# Patient Record
Sex: MALE | Race: WHITE | NOT HISPANIC OR LATINO
[De-identification: names, ages, dates, MRNs, and addresses within clinical notes are randomized per-mention and may not be internally consistent; named-entity substitution may affect disease eponyms.]

---

## 2017-11-21 ENCOUNTER — APPOINTMENT (OUTPATIENT)
Dept: HEART AND VASCULAR | Facility: CLINIC | Age: 42
End: 2017-11-21
Payer: COMMERCIAL

## 2017-11-21 VITALS
HEART RATE: 74 BPM | BODY MASS INDEX: 27.6 KG/M2 | HEIGHT: 67.72 IN | TEMPERATURE: 97.6 F | WEIGHT: 179.99 LBS | SYSTOLIC BLOOD PRESSURE: 130 MMHG | DIASTOLIC BLOOD PRESSURE: 80 MMHG | OXYGEN SATURATION: 97 %

## 2017-11-21 DIAGNOSIS — Z82.49 FAMILY HISTORY OF ISCHEMIC HEART DISEASE AND OTHER DISEASES OF THE CIRCULATORY SYSTEM: ICD-10-CM

## 2017-11-21 DIAGNOSIS — K63.5 POLYP OF COLON: ICD-10-CM

## 2017-11-21 DIAGNOSIS — Z78.9 OTHER SPECIFIED HEALTH STATUS: ICD-10-CM

## 2017-11-21 DIAGNOSIS — Z83.49 FAMILY HISTORY OF OTHER ENDOCRINE, NUTRITIONAL AND METABOLIC DISEASES: ICD-10-CM

## 2017-11-21 PROCEDURE — 99204 OFFICE O/P NEW MOD 45 MIN: CPT | Mod: 25

## 2017-11-21 PROCEDURE — 93000 ELECTROCARDIOGRAM COMPLETE: CPT

## 2017-11-21 RX ORDER — OMEPRAZOLE 20 MG/1
20 CAPSULE, DELAYED RELEASE ORAL
Refills: 0 | Status: ACTIVE | COMMUNITY

## 2017-12-10 ENCOUNTER — FORM ENCOUNTER (OUTPATIENT)
Age: 42
End: 2017-12-10

## 2017-12-11 ENCOUNTER — OUTPATIENT (OUTPATIENT)
Dept: OUTPATIENT SERVICES | Facility: HOSPITAL | Age: 42
LOS: 1 days | End: 2017-12-11
Payer: COMMERCIAL

## 2017-12-11 PROCEDURE — 75571 CT HRT W/O DYE W/CA TEST: CPT | Mod: 26

## 2017-12-11 PROCEDURE — 75571 CT HRT W/O DYE W/CA TEST: CPT

## 2017-12-13 ENCOUNTER — APPOINTMENT (OUTPATIENT)
Dept: HEART AND VASCULAR | Facility: CLINIC | Age: 42
End: 2017-12-13
Payer: COMMERCIAL

## 2017-12-13 DIAGNOSIS — R07.9 CHEST PAIN, UNSPECIFIED: ICD-10-CM

## 2017-12-13 DIAGNOSIS — G80.9 CEREBRAL PALSY, UNSPECIFIED: ICD-10-CM

## 2017-12-13 PROCEDURE — 93306 TTE W/DOPPLER COMPLETE: CPT

## 2017-12-13 PROCEDURE — 93015 CV STRESS TEST SUPVJ I&R: CPT

## 2019-03-18 ENCOUNTER — FORM ENCOUNTER (OUTPATIENT)
Age: 44
End: 2019-03-18

## 2019-03-19 ENCOUNTER — OUTPATIENT (OUTPATIENT)
Dept: OUTPATIENT SERVICES | Facility: HOSPITAL | Age: 44
LOS: 1 days | End: 2019-03-19
Payer: COMMERCIAL

## 2019-03-19 ENCOUNTER — APPOINTMENT (OUTPATIENT)
Dept: ORTHOPEDIC SURGERY | Facility: CLINIC | Age: 44
End: 2019-03-19
Payer: COMMERCIAL

## 2019-03-19 DIAGNOSIS — M25.562 PAIN IN LEFT KNEE: ICD-10-CM

## 2019-03-19 DIAGNOSIS — G89.29 PAIN IN LEFT KNEE: ICD-10-CM

## 2019-03-19 PROCEDURE — 73564 X-RAY EXAM KNEE 4 OR MORE: CPT

## 2019-03-19 PROCEDURE — 73564 X-RAY EXAM KNEE 4 OR MORE: CPT | Mod: 26,LT

## 2019-03-19 PROCEDURE — 99213 OFFICE O/P EST LOW 20 MIN: CPT

## 2019-04-12 NOTE — PHYSICAL EXAM
[de-identified] : Constitutional: Well appearing.  No acute distress.\par Mental Status: Alert & oriented to person, place and time. Normal affect.\par Pulmonary: No respiratory distress. Normal chest excursion.\par Abdomen: Soft and not distended.\par Gait: Normal.\par Ambulatory assist devices: None.\par \par Cervical spine: Skin intact. No visible deformity.  Painless active ROM without evident restriction.\par Bilateral upper extremities: Skin intact. No deformity. Painless active ROM without evident restriction.\par Thoracolumbar spine: No deformity. No tenderness. No radicular pain on passive straight leg raise bilaterally.\par Pelvis: No pelvic obliquity. No tenderness.\par Leg lengths: Equal.\par \par Bilateral Hips: No swelling or deformity. No focal tenderness. Painless and unrestricted range of motion.  No crepitation. Hip flexor and abductor power 5/5.\par \par Right Knee:\par Skin intact.\par No surgical scars.\par No erythema or ecchymosis.\par No swelling or effusion.\par No deformity.\par No focal tenderness.\par Painless and unrestricted range of motion. \par Central patellar tracking.\par No crepitation.\par No instability.\par Quadriceps power 5/5.\par \par Left Knee:\par Skin intact.\par No surgical scars.\par No erythema or ecchymosis.\par No swelling or effusion.\par No deformity.\par No focal tenderness.\par Painless and unrestricted range of motion. \par Central patellar tracking.\par No crepitation.\par No instability.\par Quadriceps power 5/5.\par \par Neurological: Intact distal crude touch sensation. Normal distal motor power. Symmetric knee jerk and ankle jerk reflexes bilaterally.\par Cardiovascular: Palpable dorsalis pedis and posterior tibialis pulses. Brisk capillary refill. No peripheral edema.\par Lymphatics:  No peripheral adenopathy appreciated.\par  [de-identified] : X-rays taken today demonstrate left knee with mild to moderate medial compartment DJD without evidence of acute fx or dislocation

## 2019-04-12 NOTE — DISCUSSION/SUMMARY
[de-identified] : Diagnosis, prognosis and treatment options discussed. Conservative management including activity modification, therapeutic exercise with PT, topical and oral antiinflammatories, steroid and HA injections discussed. Patient is not currently having any severe pain to warrant intervention at this time. Discussed activity modification and low impact exercise. Patient may follow up PRN.

## 2019-04-12 NOTE — ADDENDUM
[FreeTextEntry1] : Dr. Preston was physically present during the key portions the encounter, provided assistance as needed, and formulated the assessment and plan as documented above.\par \par

## 2019-04-12 NOTE — HISTORY OF PRESENT ILLNESS
[de-identified] : 42 y/o M presents today for follow up of left knee pain. He reports increase in pain several months ago after falling sideways into a bed frame. Pain has significantly improved since that time.  He reports mild or occasional left knee pain and also notes that the knee cracks on flexion. He can walk an unlimited distance and uses stairs normally.

## 2019-07-01 ENCOUNTER — RESULT REVIEW (OUTPATIENT)
Age: 44
End: 2019-07-01

## 2019-07-01 ENCOUNTER — OUTPATIENT (OUTPATIENT)
Dept: OUTPATIENT SERVICES | Facility: HOSPITAL | Age: 44
LOS: 1 days | Discharge: HOME | End: 2019-07-01
Payer: COMMERCIAL

## 2019-07-01 PROCEDURE — 88305 TISSUE EXAM BY PATHOLOGIST: CPT

## 2019-07-01 PROCEDURE — 43239 EGD BIOPSY SINGLE/MULTIPLE: CPT

## 2019-07-02 LAB — SURGICAL PATHOLOGY STUDY: SIGNIFICANT CHANGE UP

## 2019-07-22 ENCOUNTER — INPATIENT (INPATIENT)
Facility: HOSPITAL | Age: 44
LOS: 0 days | Discharge: HOME | DRG: 419 | End: 2019-07-23
Attending: SURGERY | Admitting: SURGERY
Payer: COMMERCIAL

## 2019-07-22 ENCOUNTER — RESULT REVIEW (OUTPATIENT)
Age: 44
End: 2019-07-22

## 2019-07-22 VITALS
DIASTOLIC BLOOD PRESSURE: 99 MMHG | HEIGHT: 69 IN | OXYGEN SATURATION: 100 % | RESPIRATION RATE: 16 BRPM | HEART RATE: 74 BPM | TEMPERATURE: 98 F | WEIGHT: 177.91 LBS | SYSTOLIC BLOOD PRESSURE: 144 MMHG

## 2019-07-22 LAB
ALBUMIN SERPL ELPH-MCNC: 4.2 G/DL — SIGNIFICANT CHANGE UP (ref 3.3–5)
ALP SERPL-CCNC: 85 U/L — SIGNIFICANT CHANGE UP (ref 40–120)
ALT FLD-CCNC: 30 U/L — SIGNIFICANT CHANGE UP (ref 10–45)
AST SERPL-CCNC: 29 U/L — SIGNIFICANT CHANGE UP (ref 10–40)
BILIRUB DIRECT SERPL-MCNC: <0.2 MG/DL — SIGNIFICANT CHANGE UP (ref 0–0.2)
BILIRUB INDIRECT FLD-MCNC: >0.1 MG/DL — LOW (ref 0.2–1)
BILIRUB SERPL-MCNC: 0.3 MG/DL — SIGNIFICANT CHANGE UP (ref 0.2–1.2)
LIDOCAIN IGE QN: 25 U/L — SIGNIFICANT CHANGE UP (ref 7–60)
PROT SERPL-MCNC: 7.1 G/DL — SIGNIFICANT CHANGE UP (ref 6–8.3)

## 2019-07-22 PROCEDURE — 99285 EMERGENCY DEPT VISIT HI MDM: CPT | Mod: 25

## 2019-07-22 PROCEDURE — 71046 X-RAY EXAM CHEST 2 VIEWS: CPT | Mod: 26

## 2019-07-22 PROCEDURE — 93010 ELECTROCARDIOGRAM REPORT: CPT

## 2019-07-22 RX ORDER — ENOXAPARIN SODIUM 100 MG/ML
40 INJECTION SUBCUTANEOUS EVERY 24 HOURS
Refills: 0 | Status: DISCONTINUED | OUTPATIENT
Start: 2019-07-22 | End: 2019-07-23

## 2019-07-22 RX ORDER — CEFOTETAN DISODIUM 1 G
1 VIAL (EA) INJECTION EVERY 12 HOURS
Refills: 0 | Status: COMPLETED | OUTPATIENT
Start: 2019-07-22 | End: 2019-07-23

## 2019-07-22 RX ORDER — BUPIVACAINE 13.3 MG/ML
20 INJECTION, SUSPENSION, LIPOSOMAL INFILTRATION ONCE
Refills: 0 | Status: DISCONTINUED | OUTPATIENT
Start: 2019-07-22 | End: 2019-07-23

## 2019-07-22 RX ORDER — SODIUM CHLORIDE 9 MG/ML
1000 INJECTION, SOLUTION INTRAVENOUS
Refills: 0 | Status: DISCONTINUED | OUTPATIENT
Start: 2019-07-22 | End: 2019-07-23

## 2019-07-22 RX ORDER — HYDROMORPHONE HYDROCHLORIDE 2 MG/ML
0.5 INJECTION INTRAMUSCULAR; INTRAVENOUS; SUBCUTANEOUS EVERY 4 HOURS
Refills: 0 | Status: DISCONTINUED | OUTPATIENT
Start: 2019-07-22 | End: 2019-07-23

## 2019-07-22 RX ORDER — SODIUM CHLORIDE 9 MG/ML
1000 INJECTION, SOLUTION INTRAVENOUS
Refills: 0 | Status: DISCONTINUED | OUTPATIENT
Start: 2019-07-22 | End: 2019-07-22

## 2019-07-22 RX ORDER — ONDANSETRON 8 MG/1
4 TABLET, FILM COATED ORAL EVERY 6 HOURS
Refills: 0 | Status: DISCONTINUED | OUTPATIENT
Start: 2019-07-22 | End: 2019-07-23

## 2019-07-22 RX ORDER — KETOROLAC TROMETHAMINE 30 MG/ML
30 SYRINGE (ML) INJECTION EVERY 6 HOURS
Refills: 0 | Status: DISCONTINUED | OUTPATIENT
Start: 2019-07-22 | End: 2019-07-23

## 2019-07-22 RX ORDER — PANTOPRAZOLE SODIUM 20 MG/1
40 TABLET, DELAYED RELEASE ORAL
Refills: 0 | Status: DISCONTINUED | OUTPATIENT
Start: 2019-07-22 | End: 2019-07-23

## 2019-07-22 RX ORDER — LATANOPROST 0.05 MG/ML
1 SOLUTION/ DROPS OPHTHALMIC; TOPICAL AT BEDTIME
Refills: 0 | Status: DISCONTINUED | OUTPATIENT
Start: 2019-07-22 | End: 2019-07-23

## 2019-07-22 RX ADMIN — ENOXAPARIN SODIUM 40 MILLIGRAM(S): 100 INJECTION SUBCUTANEOUS at 17:43

## 2019-07-22 RX ADMIN — PANTOPRAZOLE SODIUM 40 MILLIGRAM(S): 20 TABLET, DELAYED RELEASE ORAL at 20:30

## 2019-07-22 RX ADMIN — SODIUM CHLORIDE 100 MILLILITER(S): 9 INJECTION, SOLUTION INTRAVENOUS at 09:10

## 2019-07-22 RX ADMIN — Medication 30 MILLIGRAM(S): at 21:30

## 2019-07-22 RX ADMIN — Medication 30 MILLIGRAM(S): at 21:59

## 2019-07-22 NOTE — PROGRESS NOTE ADULT - SUBJECTIVE AND OBJECTIVE BOX
Procedure: Laparoscopic cholecystectomy  Surgeon: Tiffani    S: Pt has no complaints. Denies CP, SOB, SO, calf tenderness. Pain controlled with medication. Voiding and ambulating. No BF.     O:  T(C): --  T(F): --  HR: --  BP: --  RR: --  SpO2: --  Wt(kg): --                        15.1   6.78  )-----------( 235      ( 22 Jul 2019 07:10 )             45.4     07-22    145  |  106  |  18  ----------------------------<  122<H>  4.0   |  27  |  0.94    Ca    9.8      22 Jul 2019 07:10    TPro  7.1  /  Alb  4.2  /  TBili  0.3  /  DBili  <0.2  /  AST  29  /  ALT  30  /  AlkPhos  85  07-22      Gen: NAD, resting comfortably in bed  C/V: NSR  Pulm: Nonlabored breathing, no respiratory distress  Abd: soft, NT/ND Incision: c/d/i  Extrem: WWP, no calf edema, SCDs in place      A/P: 44yMale s/p laparoscopic cholecystectomy  Diet: NPO  IVF: LR  Pain/nausea control  DVT ppx  Dispo plan

## 2019-07-22 NOTE — ED PROVIDER NOTE - ATTENDING CONTRIBUTION TO CARE
Pt is a 45yo m, h/o gallstones, who p/w intermittent ruq abd pain, nausea, no vomiting, diarrhea for the past week. No urinary sx's, flank pain. No fever/ chills.    VITAL SIGNS: I have reviewed nursing notes and confirm.  CONSTITUTIONAL: Well-developed; well-nourished; in no acute distress.   SKIN:  warm and dry, no acute rash.   HEAD:  normocephalic, atraumatic.  EYES: EOM intact; conjunctiva and sclera clear.  ENT: No nasal discharge; airway clear.   NECK: Supple; non tender.  CARD: S1, S2 normal; no murmurs, gallops, or rubs. Regular rate and rhythm.   RESP:  Clear to auscultation b/l, no wheezes, rales or rhonchi.  ABD: Normal bowel sounds; soft; non-distended; + ruq tenderness, no Valentino's sign; no guarding/ rebound.  EXT: Normal ROM. No clubbing, cyanosis or edema. 2+ pulses to b/l ue/le.  NEURO: Alert, oriented, grossly unremarkable  PSYCH: Cooperative, mood and affect appropriate.    Spoke w/ surgery. Pt to be admitted to Dr. Nixon's [facility] for biliary colic, cholecystectomy. Will send pre-op labs.

## 2019-07-22 NOTE — H&P ADULT - ASSESSMENT
44M with PMH of GERD, glaucoma and known cholelithiasis presents with 1 week history of worsening RUQ abdominal pain likely biliary colic with recent RUQ ultrasound showing no evidence of acute cholecystitis.     Plan  Admit to surgery Team 4 under Dr. Nixon  NPO/IVF   Add-on for laparoscopy cholecystectomy.   DVT PPX - SCD, LVX  Type and screen x2  Zofran PRN for nausea. 44M with PMH of GERD, glaucoma and known cholelithiasis presents with 1 week history of worsening RUQ abdominal pain likely biliary colic with recent RUQ ultrasound showing no evidence of acute cholecystitis.     Plan  Admit to surgery Team 4 under Dr. Nixon  NPO/IVF   Add-on for laparoscopy cholecystectomy.   DVT PPX - SCD, LVX  Type and screen x2  Zofran PRN for nausea.   EKG

## 2019-07-22 NOTE — ED ADULT NURSE NOTE - OBJECTIVE STATEMENT
Pt states "my gallbladder been bad for a while and I have been putting off taking it out. I have been having pain on and off. It's not bad now. I am here for pre-op work up. My surgeon is expecting me".

## 2019-07-22 NOTE — BRIEF OPERATIVE NOTE - OPERATION/FINDINGS
Laparoscopic cholecystectomy performed for chronic calculous cholecystitis. Adhesions taken down around gallbladder. Critical view of safety achieved. Cystic duct and artery dissected with hook cautery and clipped, x4 on the duct, x3 on the artery. Duct and artery transected with woo. Gallbladder dissected off hepatic bed, removed with endocatch bag. Hemostasis confirmed. RUQ irrigated.

## 2019-07-22 NOTE — PRE-OP CHECKLIST - 1.
patient arrived with underwear and ring. picked by RN on 9 Uris patient arrived with underwear and ring. [facility]

## 2019-07-22 NOTE — H&P ADULT - NSHPSOCIALHISTORY_GEN_ALL_CORE
Current smoker: 4cpd for 25 years  Occasional ETOH less than 21 units / week  Denies recreational drug use    Occupation: medical record department

## 2019-07-22 NOTE — H&P ADULT - NSHPPHYSICALEXAM_GEN_ALL_CORE
GEN: NAD, awake, eyes open spontaneously  HEENT: NCAT, MMM, Trachea midline, normal conjunctiva, perrl  CHEST/LUNGS: Nonlaboured breathing, CTAB, bilateral breath sounds  CARDIAC: RRR, no m/r/g  ABDOMEN: Soft, Nontender, Nondistended, No rebound/guarding. Valentino negative  MSK: No oedema, no gross deformity of extremities  SKIN: No rashes, no petechiae, no vesicles  NEURO: CN grossly intact, normal coordination, no focal motor or sensory deficits  PSYCH: Alert, appropriate, cooperative, with capacity and insight

## 2019-07-22 NOTE — ED PROVIDER NOTE - OBJECTIVE STATEMENT
43 y/o male with hx of gallstones c/o RUQ abd pain x 1 wk. pt states told in  past he would need surgery but did not want the surgery. pt reports pain intermittent and worse past 1 wk. no fever or chills. pt states pain last night but improved this am. no diarrhea or constipation. no urinary sx's. no back pain. + nausea. no vomiting. pt states spoke with Dr Nixon and he will do surgery given worsening pain. no further complaints,.

## 2019-07-22 NOTE — H&P ADULT - NSHPREVIEWOFSYSTEMS_GEN_ALL_CORE
Constitutional: (-) fever, (-) chills  Eyes/ENT: (-) blurry vision, (-) epistaxis, (-) sore throat  Cardiovascular: (-) chest pain, (-) syncope  Respiratory: (-) cough, (-) shortness of breath  Gastrointestinal: (+) abdominal pain, (-) vomiting, (-) diarrhea  Musculoskeletal: (-) neck pain, (-) back pain, (-) joint pain  Integumentary: (-) rash, (-) edema  Neurological: (-) headache, (-) altered mental status  Psychiatric: (-) hallucinations,   Allergic/Immunologic: (-) pruritus

## 2019-07-22 NOTE — ED PROVIDER NOTE - CLINICAL SUMMARY MEDICAL DECISION MAKING FREE TEXT BOX
RUQ pain with hx of gallstones. pt well appearing.  a febrile. VSS. no ischemic changes on ecg. labs noted. pt evaluated in ED by surgery and they report outpt us done and no need for us at this time. will admit to Dr Nixon

## 2019-07-22 NOTE — H&P ADULT - NSHPLABSRESULTS_GEN_ALL_CORE
15.1   6.78  )-----------( 235      ( 22 Jul 2019 07:10 )             45.4   07-22    145  |  106  |  18  ----------------------------<  122<H>  4.0   |  27  |  0.94    Ca    9.8      22 Jul 2019 07:10    TPro  7.7  /  Alb  4.8  /  TBili  0.3  /  DBili  x   /  AST  19  /  ALT  22  /  AlkPhos  91  07-22

## 2019-07-22 NOTE — H&P ADULT - HISTORY OF PRESENT ILLNESS
HPI:   44M with PMH of glaucoma, GERD and known cholelithiasis presents to the ED with 1 week history of worsening RUQ abdominal pain. Pt describes this pain as an aching pain that comes and goes, 6/10 in severity, sometimes radiating to the back, associated with nausea but no vomiting. Denies fever, chills, constipation or diarrhoea. Pain improves with NSAIDS and worsens with fatty food. Denies dark urine, pale stool or recent weight loss. He went to the ED at [facility] on 7/17/19 for the same pain. RUQ showed cholelithiasis without cholecystitis, cbd 3mm diameter and unremarkable LFTs. He has had similar episodes of abdominal pain in the past.     PMH:  GERD - recent EGD: Reactive changes in the antral mucosa, negative for H.pylori.   Glaucoma.     PSH:   None, no previous abdominal surgeries.     FH  HTN,   Pre-DM    Meds:   Lumigan  Omeprazole.     Allergies:  Quinolones  Penicillin - anaphylaxis. HPI:   44M with PMH of glaucoma, GERD and known cholelithiasis presents to the ED with 1 week history of worsening RUQ abdominal pain. Pt describes this pain as an aching pain that comes and goes, 6/10 in severity, sometimes radiating to the back, associated with nausea but no vomiting. Denies fever, chills, constipation or diarrhoea. Pain improves with NSAIDS and worsens with fatty food. Denies dark urine, pale stool or recent weight loss. He went to the ED at [facility] on 7/17/19 for the same pain. RUQ showed cholelithiasis without cholecystitis, cbd 3mm diameter and unremarkable LFTs. He has had similar episodes of abdominal pain in the past.     PMH:  GERD - recent EGD: Reactive changes in the antral mucosa, negative for H.pylori.   Glaucoma.   Incidental finding of WPW    PSH:   None, no previous abdominal surgeries.     FH  HTN,   Pre-DM    Meds:   Lumigan  Omeprazole.     Allergies:  Quinolones  Penicillin - anaphylaxis.

## 2019-07-23 ENCOUNTER — TRANSCRIPTION ENCOUNTER (OUTPATIENT)
Age: 44
End: 2019-07-23

## 2019-07-23 VITALS
TEMPERATURE: 97 F | SYSTOLIC BLOOD PRESSURE: 136 MMHG | RESPIRATION RATE: 17 BRPM | DIASTOLIC BLOOD PRESSURE: 80 MMHG | HEART RATE: 67 BPM | OXYGEN SATURATION: 100 %

## 2019-07-23 LAB
ALBUMIN SERPL ELPH-MCNC: 3.9 G/DL — SIGNIFICANT CHANGE UP (ref 3.3–5)
ALP SERPL-CCNC: 81 U/L — SIGNIFICANT CHANGE UP (ref 40–120)
ALT FLD-CCNC: 38 U/L — SIGNIFICANT CHANGE UP (ref 10–45)
AST SERPL-CCNC: 27 U/L — SIGNIFICANT CHANGE UP (ref 10–40)
BILIRUB SERPL-MCNC: 0.3 MG/DL — SIGNIFICANT CHANGE UP (ref 0.2–1.2)
BUN SERPL-MCNC: 13 MG/DL — SIGNIFICANT CHANGE UP (ref 7–23)
CALCIUM SERPL-MCNC: 9.3 MG/DL — SIGNIFICANT CHANGE UP (ref 8.4–10.5)
CHLORIDE SERPL-SCNC: 104 MMOL/L — SIGNIFICANT CHANGE UP (ref 96–108)
CO2 SERPL-SCNC: 26 MMOL/L — SIGNIFICANT CHANGE UP (ref 22–31)
CREAT SERPL-MCNC: 0.91 MG/DL — SIGNIFICANT CHANGE UP (ref 0.5–1.3)
GLUCOSE SERPL-MCNC: 124 MG/DL — HIGH (ref 70–99)
HCT VFR BLD CALC: 41.4 % — SIGNIFICANT CHANGE UP (ref 39–50)
HGB BLD-MCNC: 13.8 G/DL — SIGNIFICANT CHANGE UP (ref 13–17)
MAGNESIUM SERPL-MCNC: 1.7 MG/DL — SIGNIFICANT CHANGE UP (ref 1.6–2.6)
MCHC RBC-ENTMCNC: 28.8 PG — SIGNIFICANT CHANGE UP (ref 27–34)
MCHC RBC-ENTMCNC: 33.3 GM/DL — SIGNIFICANT CHANGE UP (ref 32–36)
MCV RBC AUTO: 86.4 FL — SIGNIFICANT CHANGE UP (ref 80–100)
NRBC # BLD: 0 /100 WBCS — SIGNIFICANT CHANGE UP (ref 0–0)
PHOSPHATE SERPL-MCNC: 4.6 MG/DL — HIGH (ref 2.5–4.5)
PLATELET # BLD AUTO: 229 K/UL — SIGNIFICANT CHANGE UP (ref 150–400)
POTASSIUM SERPL-MCNC: 4.4 MMOL/L — SIGNIFICANT CHANGE UP (ref 3.5–5.3)
POTASSIUM SERPL-SCNC: 4.4 MMOL/L — SIGNIFICANT CHANGE UP (ref 3.5–5.3)
PROT SERPL-MCNC: 6.5 G/DL — SIGNIFICANT CHANGE UP (ref 6–8.3)
RBC # BLD: 4.79 M/UL — SIGNIFICANT CHANGE UP (ref 4.2–5.8)
RBC # FLD: 12.3 % — SIGNIFICANT CHANGE UP (ref 10.3–14.5)
SODIUM SERPL-SCNC: 139 MMOL/L — SIGNIFICANT CHANGE UP (ref 135–145)
WBC # BLD: 9.94 K/UL — SIGNIFICANT CHANGE UP (ref 3.8–10.5)
WBC # FLD AUTO: 9.94 K/UL — SIGNIFICANT CHANGE UP (ref 3.8–10.5)

## 2019-07-23 PROCEDURE — 83605 ASSAY OF LACTIC ACID: CPT

## 2019-07-23 PROCEDURE — 86901 BLOOD TYPING SEROLOGIC RH(D): CPT

## 2019-07-23 PROCEDURE — 99285 EMERGENCY DEPT VISIT HI MDM: CPT | Mod: 25

## 2019-07-23 PROCEDURE — 84100 ASSAY OF PHOSPHORUS: CPT

## 2019-07-23 PROCEDURE — 93005 ELECTROCARDIOGRAM TRACING: CPT

## 2019-07-23 PROCEDURE — 86850 RBC ANTIBODY SCREEN: CPT

## 2019-07-23 PROCEDURE — 71046 X-RAY EXAM CHEST 2 VIEWS: CPT

## 2019-07-23 PROCEDURE — 80076 HEPATIC FUNCTION PANEL: CPT

## 2019-07-23 PROCEDURE — 85027 COMPLETE CBC AUTOMATED: CPT

## 2019-07-23 PROCEDURE — 80053 COMPREHEN METABOLIC PANEL: CPT

## 2019-07-23 PROCEDURE — 85025 COMPLETE CBC W/AUTO DIFF WBC: CPT

## 2019-07-23 PROCEDURE — 86900 BLOOD TYPING SEROLOGIC ABO: CPT

## 2019-07-23 PROCEDURE — 85730 THROMBOPLASTIN TIME PARTIAL: CPT

## 2019-07-23 PROCEDURE — 36415 COLL VENOUS BLD VENIPUNCTURE: CPT

## 2019-07-23 PROCEDURE — 83690 ASSAY OF LIPASE: CPT

## 2019-07-23 PROCEDURE — 85610 PROTHROMBIN TIME: CPT

## 2019-07-23 PROCEDURE — 88304 TISSUE EXAM BY PATHOLOGIST: CPT

## 2019-07-23 PROCEDURE — 83735 ASSAY OF MAGNESIUM: CPT

## 2019-07-23 RX ORDER — TAMSULOSIN HYDROCHLORIDE 0.4 MG/1
1 CAPSULE ORAL
Qty: 5 | Refills: 0
Start: 2019-07-23 | End: 2019-07-27

## 2019-07-23 RX ORDER — ACETAMINOPHEN 500 MG
2 TABLET ORAL
Qty: 56 | Refills: 0
Start: 2019-07-23 | End: 2019-07-29

## 2019-07-23 RX ORDER — DOCUSATE SODIUM 100 MG
1 CAPSULE ORAL
Qty: 5 | Refills: 0
Start: 2019-07-23 | End: 2019-07-27

## 2019-07-23 RX ORDER — IBUPROFEN 200 MG
1 TABLET ORAL
Qty: 20 | Refills: 0
Start: 2019-07-23 | End: 2019-07-27

## 2019-07-23 RX ORDER — MAGNESIUM SULFATE 500 MG/ML
1 VIAL (ML) INJECTION ONCE
Refills: 0 | Status: COMPLETED | OUTPATIENT
Start: 2019-07-23 | End: 2019-07-23

## 2019-07-23 RX ORDER — BIMATOPROST 0.3 MG/ML
0 SOLUTION/ DROPS OPHTHALMIC
Qty: 0 | Refills: 0 | DISCHARGE

## 2019-07-23 RX ORDER — OMEPRAZOLE 10 MG/1
0 CAPSULE, DELAYED RELEASE ORAL
Qty: 0 | Refills: 0 | DISCHARGE

## 2019-07-23 RX ADMIN — Medication 30 MILLIGRAM(S): at 11:35

## 2019-07-23 RX ADMIN — PANTOPRAZOLE SODIUM 40 MILLIGRAM(S): 20 TABLET, DELAYED RELEASE ORAL at 05:17

## 2019-07-23 RX ADMIN — LATANOPROST 1 DROP(S): 0.05 SOLUTION/ DROPS OPHTHALMIC; TOPICAL at 01:26

## 2019-07-23 RX ADMIN — Medication 100 GRAM(S): at 11:23

## 2019-07-23 RX ADMIN — Medication 100 GRAM(S): at 15:42

## 2019-07-23 RX ADMIN — Medication 100 GRAM(S): at 03:12

## 2019-07-23 RX ADMIN — Medication 30 MILLIGRAM(S): at 11:23

## 2019-07-23 NOTE — DISCHARGE NOTE PROVIDER - HOSPITAL COURSE
44M with PMH of glaucoma, GERD and known cholelithiasis presents to the ED with 1 week history of worsening RUQ abdominal pain. Pt describes this pain as an aching pain that comes and goes, 6/10 in severity, sometimes radiating to the back, associated with nausea but no vomiting. Denies fever, chills, constipation or diarrhoea. Pain improves with NSAIDS and worsens with fatty food. Denies dark urine, pale stool or recent weight loss. He went to the ED at [facility] on 7/17/19 for the same pain. RUQ showed cholelithiasis without cholecystitis, cbd 3mm diameter and unremarkable LFTs. He has had similar episodes of abdominal pain in the past.         outside US shows cholecystitis. subsequently taken to OR for lap cholecystectomy. Bilirubin Total, Serum: 0.3 mg/dL.         Operative report showed: Laparoscopic cholecystectomy performed for chronic calculous cholecystitis. Adhesions taken down around gallbladder. Critical view of safety achieved. Cystic duct and artery dissected with hook cautery and clipped, x4 on the duct, x3 on the artery. Duct and artery transected with woo. Gallbladder dissected off hepatic bed, removed with endocatch bag. Hemostasis confirmed. RUQ irrigated.        Patient's post-operative course was uncomplicated. Diet was advanced as tolerated and pain was well controlled on medication. On day of discharge, pt deemed stable and ready to return home with plan to follow up as an outpatient.

## 2019-07-23 NOTE — PROGRESS NOTE ADULT - ATTENDING COMMENTS
Abdomen soft, appropriately tender, BS+, Flatus+, BM-, tolerating diet, wounds dry, clean, intact.  D/C home with F/U in the office.

## 2019-07-23 NOTE — DISCHARGE NOTE PROVIDER - NSDCFUADDINST_GEN_ALL_CORE_FT
1. Bed rest for 4 days  2. Ice packs to the abdomen at all times  3. Please drink at least 2L water every 24 hours  4. Take 2 extra strength tylenol + 1 advil (3 pills total) every 6 hours  5. You may take Vicodin at bedtime or for breakthrough pain as needed  6. Please continue a liquid diet until you have a regular bowel movement  7. Please take probiotics daily  8. Contact your doctor or go to the ER for fever > 101.5, chills, nausea, vomiting, chest pain, shortness of breath, pain not controlled by medication or excessive bleeding.  9. Please follow up with Dr. Nixon in 1-2 weeks; you may call the office to make an appointment at your earliest convenience.

## 2019-07-23 NOTE — DISCHARGE NOTE NURSING/CASE MANAGEMENT/SOCIAL WORK - NSDCDPATPORTLINK_GEN_ALL_CORE
You can access the [facility] Patient Portal, offered by [facility], by registering with the following website: [email or web address]

## 2019-07-23 NOTE — PROGRESS NOTE ADULT - SUBJECTIVE AND OBJECTIVE BOX
SUBJECTIVE: Patient seen and examined bedside by chief resident. no complaints, no n/v. pain controlled.    cefoTEtan  IVPB 1 Gram(s) IV Intermittent every 12 hours  enoxaparin Injectable 40 milliGRAM(s) SubCutaneous every 24 hours      Vital Signs Last 24 Hrs  T(C): 36.7 (23 Jul 2019 05:40), Max: 36.7 (22 Jul 2019 18:47)  T(F): 98 (23 Jul 2019 05:40), Max: 98.1 (22 Jul 2019 18:47)  HR: 73 (23 Jul 2019 08:45) (56 - 76)  BP: 123/82 (23 Jul 2019 08:45) (117/63 - 138/91)  BP(mean): --  RR: 18 (23 Jul 2019 08:45) (15 - 18)  SpO2: 96% (23 Jul 2019 08:45) (95% - 100%)  I&O's Detail    22 Jul 2019 07:01  -  23 Jul 2019 07:00  --------------------------------------------------------  IN:    lactated ringers.: 1210 mL    Oral Fluid: 240 mL    Solution: 50 mL  Total IN: 1500 mL    OUT:    Voided: 1050 mL  Total OUT: 1050 mL    Total NET: 450 mL          General: NAD, resting comfortably in bed  C/V: NSR  Pulm: Nonlabored breathing, no respiratory distress  Abd: soft, ATTP  Extrem: WWP, no edema, SCDs in place        LABS:                        13.8   9.94  )-----------( 229      ( 23 Jul 2019 07:01 )             41.4     07-23    139  |  104  |  13  ----------------------------<  124<H>  4.4   |  26  |  0.91    Ca    9.3      23 Jul 2019 07:02  Phos  4.6     07-23  Mg     1.7     07-23    TPro  6.5  /  Alb  3.9  /  TBili  0.3  /  DBili  x   /  AST  27  /  ALT  38  /  AlkPhos  81  07-23    PT/INR - ( 22 Jul 2019 07:10 )   PT: 12.6 sec;   INR: 1.11          PTT - ( 22 Jul 2019 07:10 )  PTT:29.7 sec      RADIOLOGY & ADDITIONAL STUDIES:

## 2019-07-23 NOTE — PROGRESS NOTE ADULT - ASSESSMENT
44M with PMH of GERD, glaucoma and known cholelithiasis presents with 1 week history of worsening RUQ abdominal pain likely biliary colic with recent RUQ ultrasound showing no evidence of acute cholecystitis.     NPO/IVF   Add-on for laparoscopy cholecystectomy.   DVT PPX - SCD, LVX  Zofran PRN for nausea.

## 2019-07-23 NOTE — PROGRESS NOTE ADULT - SUBJECTIVE AND OBJECTIVE BOX
INTERVAL HPI/OVERNIGHT EVENTS:   SURGERY ATTENDING    STATUS POST:  Laparoscopic cholecystectomy, lysis of adchesions    POST OPERATIVE DAY #: 1    SUBJECTIVE:  Flatus: [x ] YES [ ] NO             Bowel Movement: [ ] YES [x ] NO  Pain (0-10):       1     Pain Control Adequate: [x ] YES [ ] NO  Nausea: [ ] YES [x ] NO            Vomiting: [ ] YES [x ] NO  Diarrhea: [ ] YES [x ] NO         Constipation: [ ] YES [x ] NO     Chest Pain: [ ] YES [x ] NO    SOB:  [ ] YES [x ] NO    MEDICATIONS  (STANDING):  BUpivacaine liposome 1.3% Injectable (no eMAR) 20 milliLiter(s) Local Injection once  enoxaparin Injectable 40 milliGRAM(s) SubCutaneous every 24 hours  ketorolac   Injectable 30 milliGRAM(s) IV Push every 6 hours  latanoprost 0.005% Ophthalmic Solution 1 Drop(s) Both EYES at bedtime  pantoprazole    Tablet 40 milliGRAM(s) Oral before breakfast    MEDICATIONS  (PRN):  HYDROmorphone  Injectable 0.5 milliGRAM(s) IV Push every 4 hours PRN Moderate Pain (4 - 6)  ondansetron Injectable 4 milliGRAM(s) IV Push every 6 hours PRN Nausea      Vital Signs Last 24 Hrs  T(C): 36.2 (23 Jul 2019 16:17), Max: 36.7 (23 Jul 2019 05:40)  T(F): 97.1 (23 Jul 2019 16:17), Max: 98 (23 Jul 2019 05:40)  HR: 67 (23 Jul 2019 16:17) (59 - 73)  BP: 136/80 (23 Jul 2019 16:17) (117/63 - 136/80)  BP(mean): --  RR: 17 (23 Jul 2019 16:17) (17 - 18)  SpO2: 100% (23 Jul 2019 16:17) (96% - 100%)    PHYSICAL EXAM:      Constitutional:    Eyes:    ENMT:    Neck:    Breasts:    Back:    Respiratory:    Cardiovascular:    Gastrointestinal:    Genitourinary:    Rectal:    Extremities:    Vascular:    Neurological:    Skin:    Lymph Nodes:    Musculoskeletal:    Psychiatric:        I&O's Detail    22 Jul 2019 07:01  -  23 Jul 2019 07:00  --------------------------------------------------------  IN:    lactated ringers.: 1210 mL    Oral Fluid: 240 mL    Solution: 50 mL  Total IN: 1500 mL    OUT:    Voided: 1050 mL  Total OUT: 1050 mL    Total NET: 450 mL      23 Jul 2019 07:01  -  23 Jul 2019 20:26  --------------------------------------------------------  IN:    lactated ringers.: 550 mL    Oral Fluid: 380 mL    Solution: 50 mL  Total IN: 980 mL    OUT:    Voided: 300 mL  Total OUT: 300 mL    Total NET: 680 mL          LABS:                        13.8   9.94  )-----------( 229      ( 23 Jul 2019 07:01 )             41.4     07-23    139  |  104  |  13  ----------------------------<  124<H>  4.4   |  26  |  0.91    Ca    9.3      23 Jul 2019 07:02  Phos  4.6     07-23  Mg     1.7     07-23    TPro  6.5  /  Alb  3.9  /  TBili  0.3  /  DBili  x   /  AST  27  /  ALT  38  /  AlkPhos  81  07-23    PT/INR - ( 22 Jul 2019 07:10 )   PT: 12.6 sec;   INR: 1.11          PTT - ( 22 Jul 2019 07:10 )  PTT:29.7 sec      RADIOLOGY & ADDITIONAL STUDIES:

## 2019-07-23 NOTE — DISCHARGE NOTE PROVIDER - CARE PROVIDER_API CALL
Mary Nixon (MD)  Surgery  [address], [address]  [place], [place] [address]  Phone: [phone]  Fax: [phone]  Follow Up Time:

## 2019-07-30 LAB — SURGICAL PATHOLOGY STUDY: SIGNIFICANT CHANGE UP

## 2019-07-31 DIAGNOSIS — R33.9 RETENTION OF URINE, UNSPECIFIED: ICD-10-CM

## 2019-07-31 DIAGNOSIS — G89.29 OTHER CHRONIC PAIN: ICD-10-CM

## 2019-07-31 DIAGNOSIS — H40.9 UNSPECIFIED GLAUCOMA: ICD-10-CM

## 2019-07-31 DIAGNOSIS — K80.10 CALCULUS OF GALLBLADDER WITH CHRONIC CHOLECYSTITIS WITHOUT OBSTRUCTION: ICD-10-CM

## 2019-07-31 DIAGNOSIS — Z82.49 FAMILY HISTORY OF ISCHEMIC HEART DISEASE AND OTHER DISEASES OF THE CIRCULATORY SYSTEM: ICD-10-CM

## 2019-07-31 DIAGNOSIS — K82.8 OTHER SPECIFIED DISEASES OF GALLBLADDER: ICD-10-CM

## 2019-07-31 DIAGNOSIS — F17.210 NICOTINE DEPENDENCE, CIGARETTES, UNCOMPLICATED: ICD-10-CM

## 2019-07-31 DIAGNOSIS — Z88.0 ALLERGY STATUS TO PENICILLIN: ICD-10-CM

## 2019-07-31 DIAGNOSIS — K21.9 GASTRO-ESOPHAGEAL REFLUX DISEASE WITHOUT ESOPHAGITIS: ICD-10-CM

## 2019-09-25 PROBLEM — K81.9 CHOLECYSTITIS, UNSPECIFIED: Chronic | Status: ACTIVE | Noted: 2019-07-22

## 2019-09-25 PROBLEM — H40.9 UNSPECIFIED GLAUCOMA: Chronic | Status: ACTIVE | Noted: 2019-07-22

## 2019-09-25 PROBLEM — K21.9 GASTRO-ESOPHAGEAL REFLUX DISEASE WITHOUT ESOPHAGITIS: Chronic | Status: ACTIVE | Noted: 2019-07-22

## 2019-10-04 ENCOUNTER — APPOINTMENT (OUTPATIENT)
Dept: PLASTIC SURGERY | Facility: CLINIC | Age: 44
End: 2019-10-04
Payer: COMMERCIAL

## 2019-10-04 DIAGNOSIS — R22.2 LOCALIZED SWELLING, MASS AND LUMP, TRUNK: ICD-10-CM

## 2019-10-04 PROCEDURE — 99203 OFFICE O/P NEW LOW 30 MIN: CPT

## 2019-10-04 RX ORDER — BIMATOPROST 0.1 MG/ML
SOLUTION/ DROPS OPHTHALMIC
Refills: 0 | Status: ACTIVE | COMMUNITY

## 2019-10-04 NOTE — ASSESSMENT
[FreeTextEntry1] : Reviewed with the patient the risks benefits and alternatives of excision of this subcutaneous mass. I explained that we can perform this under local anesthesia in the office and that we will send a specimen to pathology for evaluation the risks include but are not limited to the risk of bleeding, infection, delayed wound healing, suboptimal scarring, recurrence, and need for revision surgery.\par \par Although this appears to be benign lesion until it is sent for pathology we cannot confirm this. I reviewed scar management treatments including sun avoidance, sunblock, moisturizer, and massage. We will attempt to utilize the natural creases and lines of relaxed and and wrinkles to hide the scar. It will take months and even a few years for the scar to completely mature. All scar start of red, raised and thickened and usually softened, flattened, and leg with time. There is a was a risk of hypertrophic scarring or keloid formation.\par \par The patient stated that they understood the risks of the procedure we can schedule this at her convenience.\par

## 2019-10-04 NOTE — HISTORY OF PRESENT ILLNESS
[FreeTextEntry1] : 45 y/o M self referred to discuss excision of multiple lipomas. He c/o lipoma along his ribs--reports 1 lipoma on the right and 2 lipomas on the left. He is bothered by these and indicates desire for excision. He is otherwise healthy. He is interested in removal as soon as possible. Does not report history of prior excision. Current everyday smoker (3-5 per day).

## 2019-10-07 NOTE — PHYSICAL EXAM
[NI] : Normal [de-identified] : soft, NT, ND, no hernias, no rashes. 3 discreet subq masses, well circumscribed, nontender, mobile largest 2cm x 1 cm RUQ, LUQ 1.5cm x 1.5cm x 2

## 2019-10-07 NOTE — HISTORY OF PRESENT ILLNESS
[FreeTextEntry1] : 43 y/o M self referred to discuss excision of multiple lipomas. He c/o lipoma along his ribs--reports 1 lipoma on the right and 2 lipomas on the left. He is bothered by these and indicates desire for excision. He is otherwise healthy. He is interested in removal as soon as possible. Does not report history of prior excision. Current everyday smoker (3-5 per day).

## 2019-10-08 ENCOUNTER — APPOINTMENT (OUTPATIENT)
Dept: PLASTIC SURGERY | Facility: CLINIC | Age: 44
End: 2019-10-08
Payer: COMMERCIAL

## 2019-10-08 ENCOUNTER — OUTPATIENT (OUTPATIENT)
Dept: OUTPATIENT SERVICES | Facility: HOSPITAL | Age: 44
LOS: 1 days | End: 2019-10-08
Payer: COMMERCIAL

## 2019-10-08 ENCOUNTER — RESULT REVIEW (OUTPATIENT)
Age: 44
End: 2019-10-08

## 2019-10-08 DIAGNOSIS — R22.0 LOCALIZED SWELLING, MASS AND LUMP, HEAD: ICD-10-CM

## 2019-10-08 PROCEDURE — 21552 EXC NECK LES SC 3 CM/>: CPT

## 2019-10-08 PROCEDURE — 21555 EXC NECK LES SC < 3 CM: CPT | Mod: 59

## 2019-10-08 PROCEDURE — 22902 EXC ABD LES SC < 3 CM: CPT | Mod: 59

## 2019-10-08 PROCEDURE — 88304 TISSUE EXAM BY PATHOLOGIST: CPT

## 2019-10-08 NOTE — PROCEDURE
[Nl] : None [FreeTextEntry1] : Torso Lipoma SubQ x 3 [FreeTextEntry2] : Excision Torso Lipoma SubQ x 3 [FreeTextEntry6] : After marking out the incision line overlying the lesion on the right lower thorax and two lesions on the left upper abdomen and lower thorax I injected local anesthetic and hemostatic solution and then prepped and draped in the standard fashion. Using a #15 blade I made an incision through the skin and dermis overlying each of the lesions and then continued the dissection with a combination of blunt and sharp dissection through the subQ tissue until the lipoma was identified. it was loosely circumscribed with a thin capsule, it was fully mobilized and dissected free from the surrounding soft tissue and then delivered from the wound and fully excised and sent off the field to pathology. Hemostasis was ensured and the wound was closed in a layered fashion with 3-0 monocryl buried interrupted followed by 4-0 monocryl running Subcuticular sutures for all 3 lesions. These were all consistent with lipomas. The right lipoma measured 2cm x 3cm, the left medial one measured 2cm x 1.5 cm and the lateral most lipoma on the left side measured 1.5cm x 1.5cm. a sterile dressing of Mastisol, Steris, Telfa and Tegaderm was placed. Postop Instructions reviewed.

## 2019-10-10 ENCOUNTER — APPOINTMENT (OUTPATIENT)
Dept: PLASTIC SURGERY | Facility: CLINIC | Age: 44
End: 2019-10-10
Payer: COMMERCIAL

## 2019-10-14 LAB — SURGICAL PATHOLOGY STUDY: SIGNIFICANT CHANGE UP

## 2019-10-28 NOTE — PHYSICAL EXAM
[NI] : Normal [de-identified] : soft, NT, ND, no hernias, no rashes. 3 discreet subq masses, well circumscribed, nontender, mobile largest 2cm x 1 cm RUQ, LUQ 1.5cm x 1.5cm x 2

## 2019-10-29 ENCOUNTER — APPOINTMENT (OUTPATIENT)
Dept: PLASTIC SURGERY | Facility: CLINIC | Age: 44
End: 2019-10-29
Payer: COMMERCIAL

## 2019-11-04 ENCOUNTER — APPOINTMENT (OUTPATIENT)
Dept: PLASTIC SURGERY | Facility: CLINIC | Age: 44
End: 2019-11-04
Payer: COMMERCIAL

## 2019-11-04 DIAGNOSIS — D17.1 BENIGN LIPOMATOUS NEOPLASM OF SKIN AND SUBCUTANEOUS TISSUE OF TRUNK: ICD-10-CM

## 2019-11-04 PROCEDURE — 99024 POSTOP FOLLOW-UP VISIT: CPT

## 2019-11-04 NOTE — HISTORY OF PRESENT ILLNESS
[FreeTextEntry1] : 4 weeks PO from excision lipoma x3 chest and abdomen. No complaints. healing well. 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

## 2019-11-04 NOTE — ASSESSMENT
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

## 2019-11-04 NOTE — REASON FOR VISIT
[Initial Evaluation] : an initial evaluation [Post Op: _________] : a [unfilled] post op visit [FreeTextEntry1] : lipoma abdomen

## 2020-04-25 ENCOUNTER — MESSAGE (OUTPATIENT)
Age: 45
End: 2020-04-25

## 2023-04-20 NOTE — ED PROVIDER NOTE - ENMT, MLM
Outreach attempt was made to schedule a Medicare Wellness Visit. This was the first attempt. Contact was not made, Nursing Home patient.   Airway patent, Nasal mucosa clear. Mouth with normal mucosa. Throat has no vesicles, no oropharyngeal exudates and uvula is midline.